# Patient Record
Sex: FEMALE | Race: WHITE | NOT HISPANIC OR LATINO | Employment: FULL TIME | URBAN - NONMETROPOLITAN AREA
[De-identification: names, ages, dates, MRNs, and addresses within clinical notes are randomized per-mention and may not be internally consistent; named-entity substitution may affect disease eponyms.]

---

## 2023-02-14 ENCOUNTER — OFFICE VISIT (OUTPATIENT)
Dept: FAMILY MEDICINE CLINIC | Facility: CLINIC | Age: 48
End: 2023-02-14
Payer: COMMERCIAL

## 2023-02-14 VITALS
HEIGHT: 64 IN | DIASTOLIC BLOOD PRESSURE: 72 MMHG | BODY MASS INDEX: 28 KG/M2 | RESPIRATION RATE: 15 BRPM | TEMPERATURE: 98 F | OXYGEN SATURATION: 98 % | SYSTOLIC BLOOD PRESSURE: 118 MMHG | HEART RATE: 88 BPM | WEIGHT: 164 LBS

## 2023-02-14 DIAGNOSIS — Z12.11 COLON CANCER SCREENING: ICD-10-CM

## 2023-02-14 DIAGNOSIS — I10 PRIMARY HYPERTENSION: ICD-10-CM

## 2023-02-14 DIAGNOSIS — Z12.31 ENCOUNTER FOR SCREENING MAMMOGRAM FOR MALIGNANT NEOPLASM OF BREAST: ICD-10-CM

## 2023-02-14 DIAGNOSIS — E89.0 POSTOPERATIVE HYPOTHYROIDISM: ICD-10-CM

## 2023-02-14 DIAGNOSIS — E78.2 MIXED HYPERLIPIDEMIA: ICD-10-CM

## 2023-02-14 DIAGNOSIS — Z00.00 WELL ADULT EXAM: Primary | ICD-10-CM

## 2023-02-14 LAB
ALBUMIN SERPL-MCNC: 4.6 G/DL (ref 3.5–5.2)
ALBUMIN/GLOB SERPL: 1.8 G/DL
ALP SERPL-CCNC: 44 U/L (ref 39–117)
ALT SERPL W P-5'-P-CCNC: 12 U/L (ref 1–33)
ANION GAP SERPL CALCULATED.3IONS-SCNC: 7.4 MMOL/L (ref 5–15)
AST SERPL-CCNC: 11 U/L (ref 1–32)
BACTERIA UR QL AUTO: ABNORMAL /HPF
BILIRUB SERPL-MCNC: 0.3 MG/DL (ref 0–1.2)
BILIRUB UR QL STRIP: NEGATIVE
BUN SERPL-MCNC: 14 MG/DL (ref 6–20)
BUN/CREAT SERPL: 23 (ref 7–25)
CALCIUM SPEC-SCNC: 8.8 MG/DL (ref 8.6–10.5)
CHLORIDE SERPL-SCNC: 105 MMOL/L (ref 98–107)
CHOLEST SERPL-MCNC: 209 MG/DL (ref 0–200)
CLARITY UR: ABNORMAL
CO2 SERPL-SCNC: 26.6 MMOL/L (ref 22–29)
COLOR UR: YELLOW
CREAT SERPL-MCNC: 0.61 MG/DL (ref 0.57–1)
DEPRECATED RDW RBC AUTO: 39.9 FL (ref 37–54)
EGFRCR SERPLBLD CKD-EPI 2021: 111.1 ML/MIN/1.73
ERYTHROCYTE [DISTWIDTH] IN BLOOD BY AUTOMATED COUNT: 12 % (ref 12.3–15.4)
GLOBULIN UR ELPH-MCNC: 2.6 GM/DL
GLUCOSE SERPL-MCNC: 96 MG/DL (ref 65–99)
GLUCOSE UR STRIP-MCNC: NEGATIVE MG/DL
HCT VFR BLD AUTO: 38.6 % (ref 34–46.6)
HCV AB SER DONR QL: NORMAL
HDLC SERPL-MCNC: 38 MG/DL (ref 40–60)
HGB BLD-MCNC: 12.8 G/DL (ref 12–15.9)
HGB UR QL STRIP.AUTO: ABNORMAL
HYALINE CASTS UR QL AUTO: ABNORMAL /LPF
KETONES UR QL STRIP: NEGATIVE
LDLC SERPL CALC-MCNC: 144 MG/DL (ref 0–100)
LDLC/HDLC SERPL: 3.72 {RATIO}
LEUKOCYTE ESTERASE UR QL STRIP.AUTO: NEGATIVE
MCH RBC QN AUTO: 30.1 PG (ref 26.6–33)
MCHC RBC AUTO-ENTMCNC: 33.2 G/DL (ref 31.5–35.7)
MCV RBC AUTO: 90.8 FL (ref 79–97)
NITRITE UR QL STRIP: NEGATIVE
PH UR STRIP.AUTO: 5.5 [PH] (ref 5–8)
PLATELET # BLD AUTO: 341 10*3/MM3 (ref 140–450)
PMV BLD AUTO: 10.8 FL (ref 6–12)
POTASSIUM SERPL-SCNC: 4.3 MMOL/L (ref 3.5–5.2)
PROT SERPL-MCNC: 7.2 G/DL (ref 6–8.5)
PROT UR QL STRIP: ABNORMAL
RBC # BLD AUTO: 4.25 10*6/MM3 (ref 3.77–5.28)
RBC # UR STRIP: ABNORMAL /HPF
REF LAB TEST METHOD: ABNORMAL
SODIUM SERPL-SCNC: 139 MMOL/L (ref 136–145)
SP GR UR STRIP: 1.02 (ref 1–1.03)
SQUAMOUS #/AREA URNS HPF: ABNORMAL /HPF
T4 FREE SERPL-MCNC: 1.63 NG/DL (ref 0.93–1.7)
TRIGL SERPL-MCNC: 149 MG/DL (ref 0–150)
TSH SERPL DL<=0.05 MIU/L-ACNC: 0.76 UIU/ML (ref 0.27–4.2)
UROBILINOGEN UR QL STRIP: ABNORMAL
VLDLC SERPL-MCNC: 27 MG/DL (ref 5–40)
WBC # UR STRIP: ABNORMAL /HPF
WBC NRBC COR # BLD: 5.02 10*3/MM3 (ref 3.4–10.8)

## 2023-02-14 PROCEDURE — 84439 ASSAY OF FREE THYROXINE: CPT | Performed by: FAMILY MEDICINE

## 2023-02-14 PROCEDURE — 86803 HEPATITIS C AB TEST: CPT | Performed by: FAMILY MEDICINE

## 2023-02-14 PROCEDURE — 90715 TDAP VACCINE 7 YRS/> IM: CPT | Performed by: FAMILY MEDICINE

## 2023-02-14 PROCEDURE — 90471 IMMUNIZATION ADMIN: CPT | Performed by: FAMILY MEDICINE

## 2023-02-14 PROCEDURE — 81001 URINALYSIS AUTO W/SCOPE: CPT | Performed by: FAMILY MEDICINE

## 2023-02-14 PROCEDURE — 80050 GENERAL HEALTH PANEL: CPT | Performed by: FAMILY MEDICINE

## 2023-02-14 PROCEDURE — 99396 PREV VISIT EST AGE 40-64: CPT | Performed by: FAMILY MEDICINE

## 2023-02-14 PROCEDURE — 80061 LIPID PANEL: CPT | Performed by: FAMILY MEDICINE

## 2023-02-14 RX ORDER — LEVOTHYROXINE SODIUM 0.12 MG/1
1 TABLET ORAL DAILY
COMMUNITY
Start: 2023-02-04

## 2023-02-14 RX ORDER — BISOPROLOL FUMARATE 10 MG/1
10 TABLET, FILM COATED ORAL
COMMUNITY
Start: 2023-02-07

## 2023-02-14 NOTE — PROGRESS NOTES
Female Physical Note      Date: 2023   Patient Name: Reanna Alejandre  : 1975   MRN: [id number]     Chief Complaint:    Chief Complaint   Patient presents with   • Annual Exam       History of Present Illness: Reanna Alejandre is a 47 y.o. female who is here today for their annual health maintenance and physical.  Patient has been doing well since last being seen without any difficulties except for her occasional headache.  Patient states that she does take Tylenol for this and does appear to be improved.  She denies any issues with her medications.  She is taking them without side effects.  Patient has continued with normal activity, appetite and sleep.  Patient denies any other cardiovascular, respiratory, gastrointestinal, urologic or neurologic complaints.      Subjective      Review of Systems:   Review of Systems   Constitutional: Negative for activity change, appetite change and fatigue.   Respiratory: Negative for cough, shortness of breath and wheezing.    Cardiovascular: Negative for chest pain, palpitations and leg swelling.   Gastrointestinal: Negative for abdominal distention, abdominal pain, blood in stool, constipation, diarrhea, nausea, vomiting, GERD and indigestion.   Genitourinary: Negative for difficulty urinating, dysuria, flank pain, frequency, hematuria and urgency.   Musculoskeletal: Negative for arthralgias, back pain, gait problem, joint swelling and myalgias.   Neurological: Positive for headache. Negative for dizziness, tremors, syncope, weakness and memory problem.   Psychiatric/Behavioral: Negative for behavioral problems, sleep disturbance and depressed mood. The patient is not nervous/anxious.        Past Medical History, Social History, Family History and Care Team were all reviewed with patient and updated as appropriate.     Medications:     Current Outpatient Medications:   •  bisoprolol (ZEBeta) 10 MG tablet, Take 10 mg by mouth every night at bedtime., Disp: , Rfl:   •   levothyroxine (SYNTHROID, LEVOTHROID) 125 MCG tablet, Take 1 tablet by mouth Daily., Disp: , Rfl:     Allergies:   No Known Allergies    Immunizations:  Health Maintenance Summary          Overdue - COLORECTAL CANCER SCREENING (COLONOSCOPY - Every 10 Years) Overdue - never done    No completion, postpone, or frequency change history exists for this topic.          Ordered - HEPATITIS C SCREENING (Once) Ordered on 2/14/2023    No completion, postpone, or frequency change history exists for this topic.          Overdue - PAP SMEAR (Every 3 Years) Overdue - never done    No completion, postpone, or frequency change history exists for this topic.          Ordered - LIPID PANEL (Yearly) Ordered on 2/14/2023    No completion, postpone, or frequency change history exists for this topic.          Postponed - INFLUENZA VACCINE (Yearly - August to March) Postponed until 3/31/2023    02/14/2023  Postponed until 3/31/2023 by Cory Meyer MD (Patient Refused)    12/03/2017  Imm Admin: Influenza Quad Vaccine (Inpatient)    12/02/2010  Imm Admin: Influenza Quad Vaccine (Inpatient)          Postponed - COVID-19 Vaccine (2 - Pfizer series) Postponed until 3/5/2024    02/14/2023  Postponed until 3/5/2024 by Cory Meyer MD (Pending event)    09/09/2021  Imm Admin: COVID-19 (PFIZER) PURPLE CAP          ANNUAL PHYSICAL (Yearly) Next due on 2/14/2024 02/14/2023  Done          TDAP/TD VACCINES (2 - Td or Tdap) Next due on 2/14/2033 02/14/2023  Imm Admin: Tdap          Pneumococcal Vaccine 0-64 (Series Information) Aged Out    02/14/2023  Postponed until 10/23/2040 by Cory Meyer MD (Not Indicated)                 Orders Placed This Encounter   Procedures   • Tdap Vaccine Greater Than or Equal To 6yo IM        Colorectal Screening:   No family history.  FIT test ordered.  Last Completed Colonoscopy     This patient has no relevant Health Maintenance data.        Pap: Patient states is  "up-to-date.  We will receive the information from Alley MACK.  If its been longer than 3 years we will make arrangements for follow-up Pap smear.  Last Completed Pap Smear     This patient has no relevant Health Maintenance data.         Mammogram: Ordered.      Last Completed Mammogram     This patient has no relevant Health Maintenance data.           CT for Smoker (Age 50-80, 20 pk yr):   Deferred.  Bone Density/DEXA (Age 65 or high risk): Deferred.  Hep C (Age 18-79 once): Ordered  HIV (Age 15-65 once): No results found for: HIV1X2  A1c: No results found for: HGBA1C   Lipid panel:  No results found for: LIPIDEXCLUSI    The ASCVD Risk score (Alex DK, et al., 2019) failed to calculate for the following reasons:    Cannot find a previous HDL lab    Cannot find a previous total cholesterol lab    Dermatology: Advise if necessary.  Ophthalmologist: Up-to-date.  Dentist: Up-to-date.    Tobacco Use: Medium Risk   • Smoking Tobacco Use: Former   • Smokeless Tobacco Use: Never   • Passive Exposure: Past       Social History     Substance and Sexual Activity   Alcohol Use Not Currently        Social History     Substance and Sexual Activity   Drug Use Not Currently        Diet/Physical activity: Well-balanced diet/daily exercise.    Sexual Health: No issues at present time.   Menopause: No issues at present time.  Menstrual Cycles: No issues at present time.    : PHQ-2 Depression Screening  PHQ-9 Total Score: 0     Measures:   Advanced Care Planning:   Patient does not have an advance directive, information provided.    Smoking Cessation:   Non-smoker.    Objective     Physical Exam:  Vital Signs:   Vitals:    02/14/23 0820   BP: 118/72   BP Location: Left arm   Patient Position: Sitting   Cuff Size: Adult   Pulse: 88   Resp: 15   Temp: 98 °F (36.7 °C)   SpO2: 98%   Weight: 74.4 kg (164 lb)   Height: 162.6 cm (64\")     Body mass index is 28.15 kg/m².     Physical Exam  Vitals and nursing note reviewed. "   Constitutional:       Appearance: Normal appearance.   HENT:      Head: Normocephalic and atraumatic.      Nose: Nose normal.      Mouth/Throat:      Pharynx: Oropharynx is clear.   Eyes:      Extraocular Movements: Extraocular movements intact.      Pupils: Pupils are equal, round, and reactive to light.   Neck:      Thyroid: No thyroid mass or thyromegaly.      Trachea: Trachea normal.   Cardiovascular:      Rate and Rhythm: Normal rate and regular rhythm.      Pulses: Normal pulses. No decreased pulses.      Heart sounds: Normal heart sounds.   Pulmonary:      Effort: Pulmonary effort is normal.      Breath sounds: Normal breath sounds.   Abdominal:      General: Abdomen is flat. Bowel sounds are normal.      Palpations: Abdomen is soft.      Tenderness: There is no abdominal tenderness.   Musculoskeletal:      Cervical back: Neck supple.      Right lower leg: No edema.      Left lower leg: No edema.   Lymphadenopathy:      Cervical: No cervical adenopathy.   Skin:     General: Skin is warm and dry.   Neurological:      General: No focal deficit present.      Mental Status: She is alert and oriented to person, place, and time.      Sensory: Sensation is intact.      Motor: Motor function is intact.      Coordination: Coordination is intact.   Psychiatric:         Attention and Perception: Attention normal.         Mood and Affect: Mood normal.         Speech: Speech normal.         Behavior: Behavior normal.         POCT Results (if applicable);   No results found for this or any previous visit.     Procedures    Assessment / Plan      Assessment/Plan:   Diagnoses and all orders for this visit:    1. Well adult exam (Primary)   Patient had wellness exam performed today that did not reveal any abnormality.  We did discuss anticipatory guidance as well as safety issues.  We will obtain a fit test for colon cancer screening.  We will order a mammogram and will find out when her last Pap smear results.  We will  continue to monitor and address accordingly.  -     CBC (No Diff); Future  -     Comprehensive Metabolic Panel; Future  -     Hepatitis C Antibody; Future  -     Lipid Panel; Future  -     Urinalysis With Culture If Indicated - Urine, Clean Catch; Future  -     TSH; Future  -     T4, Free; Future  -     Tdap Vaccine Greater Than or Equal To 6yo IM  -     Mammo Screening Digital Tomosynthesis Bilateral With CAD; Future  -     CBC (No Diff)  -     Comprehensive Metabolic Panel  -     Hepatitis C Antibody  -     Lipid Panel  -     Urinalysis With Culture If Indicated - Urine, Clean Catch  -     TSH  -     T4, Free    2. Mixed hyperlipidemia   A lipid profile will be obtained.  We will restart cholesterol medication if her levels suggest an increase in her 10-year cardiovascular risk.    3. Postoperative hypothyroidism   Patient is doing well symptomatically.  We will obtain thyroid function test will make adjustments based on these findings.    4. Primary hypertension   Blood pressure doing well at present time.  No adjustments are necessary.    5. Colon cancer screening   No family history.  A fit test will be ordered.    6. Encounter for screening mammogram for malignant neoplasm of breast   Mammogram will be ordered.       Healthcare Maintenance:  Counseling provided based on age appropriate USPSTF guidelines.  BMI is >= 25 and <30. (Overweight) The following options were offered after discussion;: exercise counseling/recommendations and nutrition counseling/recommendations    Reanna Alejandre voices understanding and acceptance of this advice and will call back with any further questions or concerns. AVS with preventive healthcare tips printed for patient.     Follow Up:   Return in about 6 months (around 8/14/2023) for Recheck.         At [facility], we believe that sharing information builds trust and better relationships. You are receiving this note because you recently visited [facility]. It is possible you  will see health information before a provider has talked with you about it. This kind of information can be easy to misunderstand. To help you fully understand what it means for your health, we urge you to discuss this note with your provider.    Cory Meyer MD  [facility]

## 2023-05-08 RX ORDER — BISOPROLOL FUMARATE 10 MG/1
TABLET, FILM COATED ORAL
Qty: 90 TABLET | Refills: 3 | Status: SHIPPED | OUTPATIENT
Start: 2023-05-08

## 2023-08-06 RX ORDER — LEVOTHYROXINE SODIUM 0.12 MG/1
TABLET ORAL DAILY
Qty: 90 TABLET | Refills: 1 | Status: SHIPPED | OUTPATIENT
Start: 2023-08-06

## 2023-08-15 ENCOUNTER — OFFICE VISIT (OUTPATIENT)
Dept: FAMILY MEDICINE CLINIC | Facility: CLINIC | Age: 48
End: 2023-08-15
Payer: COMMERCIAL

## 2023-08-15 VITALS
TEMPERATURE: 98.6 F | HEIGHT: 64 IN | HEART RATE: 83 BPM | DIASTOLIC BLOOD PRESSURE: 82 MMHG | SYSTOLIC BLOOD PRESSURE: 124 MMHG | BODY MASS INDEX: 26.36 KG/M2 | OXYGEN SATURATION: 98 % | WEIGHT: 154.4 LBS

## 2023-08-15 DIAGNOSIS — E78.2 MIXED HYPERLIPIDEMIA: Primary | ICD-10-CM

## 2023-08-15 DIAGNOSIS — E89.0 POSTOPERATIVE HYPOTHYROIDISM: ICD-10-CM

## 2023-08-15 DIAGNOSIS — I10 PRIMARY HYPERTENSION: ICD-10-CM

## 2023-08-15 PROCEDURE — 99213 OFFICE O/P EST LOW 20 MIN: CPT | Performed by: FAMILY MEDICINE

## 2023-08-15 NOTE — PATIENT INSTRUCTIONS
Advance Care Planning and Advance Directives     You make decisions on a daily basis - decisions about where you want to live, your career, your home, your life. Perhaps one of the most important decisions you face is your choice for future medical care. Take time to talk with your family and your healthcare team and start planning today.  Advance Care Planning is a process that can help you:  Understand possible future healthcare decisions in light of your own experiences  Reflect on those decision in light of your goals and values  Discuss your decisions with those closest to you and the healthcare professionals that care for you  Make a plan by creating a document that reflects your wishes    Surrogate Decision Maker  In the event of a medical emergency, which has left you unable to communicate or to make your own decisions, you would need someone to make decisions for you.  It is important to discuss your preferences for medical treatment with this person while you are in good health.     Qualities of a surrogate decision maker:  Willing to take on this role and responsibility  Knows what you want for future medical care  Willing to follow your wishes even if they don't agree with them  Able to make difficult medical decisions under stressful circumstances    Advance Directives  These are legal documents you can create that will guide your healthcare team and decision maker(s) when needed. These documents can be stored in the electronic medical record.    Living Will - a legal document to guide your care if you have a terminal condition or a serious illness and are unable to communicate. States vary by statute in document names/types, but most forms may include one or more of the following:        -  Directions regarding life-prolonging treatments        -  Directions regarding artificially provided nutrition/hydration        -  Choosing a healthcare decision maker        -  Direction regarding organ/tissue  donation    Durable Power of  for Healthcare - this document names an -in-fact to make medical decisions for you, but it may also allow this person to make personal and financial decisions for you. Please seek the advice of an  if you need this type of document.    **Advance Directives are not required and no one may discriminate against you if you do not sign one.    Medical Orders  Many states allow specific forms/orders signed by your physician to record your wishes for medical treatment in your current state of health. This form, signed in personal communication with your physician, addresses resuscitation and other medical interventions that you may or may not want.      For more information or to schedule a time with a [facility] Advance Care Planning Facilitator contact: [email or web address] or call [phone] and someone will contact you directly.

## 2023-08-15 NOTE — PROGRESS NOTES
Follow Up Office Visit      Date: 08/15/2023   Patient Name: Reanna Alejandre  : 1975   MRN: [id number]     Chief Complaint:    Chief Complaint   Patient presents with    Follow-up       History of Present Illness: Reanna Alejandre is a 47 y.o. female who is here today for follow-up.  Patient been doing well since last being seen with time problems noted.  She does continue to take her medication as prescribed without side effects.  She has continue with her usual activity, appetite and sleep.  Patient denies any other cardiovascular, respiratory, gastrointestinal, urologic or neurologic complaints.    Subjective      Review of Systems:   Review of Systems   Constitutional:  Negative for activity change, appetite change and fatigue.   Respiratory:  Negative for cough and shortness of breath.    Cardiovascular:  Negative for chest pain, palpitations and leg swelling.   Gastrointestinal:  Negative for abdominal pain, constipation, diarrhea, nausea, vomiting, GERD and indigestion.   Genitourinary:  Negative for dysuria, flank pain, frequency and urgency.   Musculoskeletal:  Negative for arthralgias and myalgias.   Neurological:  Negative for dizziness, weakness and memory problem.   Psychiatric/Behavioral:  Negative for sleep disturbance and depressed mood. The patient is not nervous/anxious.      I have reviewed the patients family history, social history, past medical history, past surgical history and have updated it as appropriate.     Medications:     Current Outpatient Medications:     bisoprolol (ZEBeta) 10 MG tablet, TAKE 1 TABLET BY MOUTH EVERY NIGHT AT BEDTIME, Disp: 90 tablet, Rfl: 3    levothyroxine (SYNTHROID, LEVOTHROID) 125 MCG tablet, TAKE 1 TABLET BY MOUTH DAILY, Disp: 90 tablet, Rfl: 1    Allergies:   No Known Allergies    Immunizations:   Immunization History   Administered Date(s) Administered    COVID-19 (PFIZER) Purple Cap Monovalent 2021    Influenza Quad Vaccine (Inpatient) 2010,  "12/03/2017    Tdap 02/14/2023        Objective     Physical Exam: Please see above  Vital Signs:   Vitals:    08/15/23 0807   BP: 124/82   BP Location: Left arm   Patient Position: Sitting   Cuff Size: Adult   Pulse: 83   Temp: 98.6 øF (37 øC)   SpO2: 98%   Weight: 70 kg (154 lb 6.4 oz)   Height: 162.6 cm (64\")  Comment: patient stated     Body mass index is 26.5 kg/mý.          Physical Exam  Vitals and nursing note reviewed.   Constitutional:       Appearance: Normal appearance.   HENT:      Head: Normocephalic and atraumatic.      Nose: Nose normal.      Mouth/Throat:      Pharynx: Oropharynx is clear.   Eyes:      Extraocular Movements: Extraocular movements intact.      Pupils: Pupils are equal, round, and reactive to light.   Neck:      Thyroid: No thyroid mass or thyromegaly.      Trachea: Trachea normal.   Cardiovascular:      Rate and Rhythm: Normal rate and regular rhythm.      Pulses: Normal pulses. No decreased pulses.      Heart sounds: Normal heart sounds.   Pulmonary:      Effort: Pulmonary effort is normal.      Breath sounds: Normal breath sounds.   Abdominal:      General: Abdomen is flat. Bowel sounds are normal.      Palpations: Abdomen is soft.      Tenderness: There is no abdominal tenderness.   Musculoskeletal:      Cervical back: Neck supple.      Right lower leg: No edema.      Left lower leg: No edema.   Lymphadenopathy:      Cervical: No cervical adenopathy.   Skin:     General: Skin is warm and dry.   Neurological:      General: No focal deficit present.      Mental Status: She is alert and oriented to person, place, and time.      Sensory: Sensation is intact.      Motor: Motor function is intact.      Coordination: Coordination is intact.   Psychiatric:         Attention and Perception: Attention normal.         Mood and Affect: Mood normal.         Speech: Speech normal.         Behavior: Behavior normal.       Procedures    Results:   Labs:   TSH   Date Value Ref Range Status "   02/14/2023 0.761 0.270 - 4.200 uIU/mL Final        POCT Results (if applicable):   Results for orders placed or performed in visit on 02/21/23   POCT FECAL OCCULT BLOOD BY IMMUNOASSAY    Specimen: Stool   Result Value Ref Range    POC OCCULT BLOOD BY IMMUNOASSAY Negative Negative       Imaging:   No valid procedures specified.     Measures:   Advanced Care Planning:   Patient does not have an advance directive, information provided.    Smoking Cessation:   Non-smoker.    Assessment / Plan      Assessment/Plan:   Diagnoses and all orders for this visit:    1. Mixed hyperlipidemia (Primary)   We have discussed her continuing the diet regimen.  We will plan on obtaining laboratory data in 6 months when she returns to clinic.  If there are other concerns further assessment treatment will be necessary.    2. Postoperative hypothyroidism   Patient is doing well at present time with respect to her dose of Synthroid.  Patient has not had any complaints of hypothyroidism at present time.  We will not obtain laboratory data today.  We will plan on reassessing in 6 months time.    3. Primary hypertension   Blood pressure is doing well currently.  We will not make any adjustments at present time.  She has tolerated the medication thus far.      Follow Up:   Return in about 6 months (around 2/15/2024) for Annual physical.      At [facility], we believe that sharing information builds trust and better relationships. You are receiving this note because you recently visited [facility]. It is possible you will see health information before a provider has talked with you about it. This kind of information can be easy to misunderstand. To help you fully understand what it means for your health, we urge you to discuss this note with your provider.    Cory Meyer MD  [facility] LancasterAnswers submitted by the patient for this visit:  Primary Reason for Visit (Submitted on 8/14/2023)  What is the primary reason for  your visit?: Physical

## 2024-02-02 RX ORDER — LEVOTHYROXINE SODIUM 0.12 MG/1
TABLET ORAL DAILY
Qty: 90 TABLET | Refills: 1 | Status: SHIPPED | OUTPATIENT
Start: 2024-02-02

## 2024-02-15 ENCOUNTER — OFFICE VISIT (OUTPATIENT)
Dept: FAMILY MEDICINE CLINIC | Facility: CLINIC | Age: 49
End: 2024-02-15
Payer: COMMERCIAL

## 2024-02-15 ENCOUNTER — PATIENT ROUNDING (BHMG ONLY) (OUTPATIENT)
Dept: FAMILY MEDICINE CLINIC | Facility: CLINIC | Age: 49
End: 2024-02-15
Payer: COMMERCIAL

## 2024-02-15 ENCOUNTER — LAB (OUTPATIENT)
Dept: FAMILY MEDICINE CLINIC | Facility: CLINIC | Age: 49
End: 2024-02-15
Payer: COMMERCIAL

## 2024-02-15 VITALS
OXYGEN SATURATION: 99 % | TEMPERATURE: 98.2 F | DIASTOLIC BLOOD PRESSURE: 80 MMHG | HEART RATE: 85 BPM | SYSTOLIC BLOOD PRESSURE: 134 MMHG | BODY MASS INDEX: 26.15 KG/M2 | WEIGHT: 153.2 LBS | HEIGHT: 64 IN | RESPIRATION RATE: 16 BRPM

## 2024-02-15 DIAGNOSIS — Z12.31 ENCOUNTER FOR SCREENING MAMMOGRAM FOR MALIGNANT NEOPLASM OF BREAST: ICD-10-CM

## 2024-02-15 DIAGNOSIS — Z00.00 WELL ADULT EXAM: ICD-10-CM

## 2024-02-15 DIAGNOSIS — E53.8 B12 DEFICIENCY: ICD-10-CM

## 2024-02-15 DIAGNOSIS — Z00.00 WELL ADULT EXAM: Primary | ICD-10-CM

## 2024-02-15 DIAGNOSIS — B35.4 TINEA CORPORIS: ICD-10-CM

## 2024-02-15 DIAGNOSIS — Z12.11 COLON CANCER SCREENING: ICD-10-CM

## 2024-02-15 DIAGNOSIS — E78.2 MIXED HYPERLIPIDEMIA: ICD-10-CM

## 2024-02-15 DIAGNOSIS — E89.0 POSTOPERATIVE HYPOTHYROIDISM: ICD-10-CM

## 2024-02-15 DIAGNOSIS — I10 PRIMARY HYPERTENSION: ICD-10-CM

## 2024-02-15 LAB
ALBUMIN SERPL-MCNC: 4.3 G/DL (ref 3.5–5.2)
ALBUMIN/GLOB SERPL: 1.4 G/DL
ALP SERPL-CCNC: 42 U/L (ref 39–117)
ALT SERPL W P-5'-P-CCNC: 11 U/L (ref 1–33)
ANION GAP SERPL CALCULATED.3IONS-SCNC: 8.9 MMOL/L (ref 5–15)
AST SERPL-CCNC: 16 U/L (ref 1–32)
BILIRUB SERPL-MCNC: <0.2 MG/DL (ref 0–1.2)
BUN SERPL-MCNC: 14 MG/DL (ref 6–20)
BUN/CREAT SERPL: 20.3 (ref 7–25)
CALCIUM SPEC-SCNC: 8.8 MG/DL (ref 8.6–10.5)
CHLORIDE SERPL-SCNC: 104 MMOL/L (ref 98–107)
CHOLEST SERPL-MCNC: 204 MG/DL (ref 0–200)
CO2 SERPL-SCNC: 26.1 MMOL/L (ref 22–29)
CREAT SERPL-MCNC: 0.69 MG/DL (ref 0.57–1)
DEPRECATED RDW RBC AUTO: 37 FL (ref 37–54)
EGFRCR SERPLBLD CKD-EPI 2021: 107.2 ML/MIN/1.73
ERYTHROCYTE [DISTWIDTH] IN BLOOD BY AUTOMATED COUNT: 11.7 % (ref 12.3–15.4)
GLOBULIN UR ELPH-MCNC: 3 GM/DL
GLUCOSE SERPL-MCNC: 88 MG/DL (ref 65–99)
HBA1C MFR BLD: 5.4 % (ref 4.8–5.6)
HCT VFR BLD AUTO: 37.3 % (ref 34–46.6)
HDLC SERPL-MCNC: 39 MG/DL (ref 40–60)
HGB BLD-MCNC: 12.5 G/DL (ref 12–15.9)
LDLC SERPL CALC-MCNC: 142 MG/DL (ref 0–100)
LDLC/HDLC SERPL: 3.57 {RATIO}
MCH RBC QN AUTO: 29.9 PG (ref 26.6–33)
MCHC RBC AUTO-ENTMCNC: 33.5 G/DL (ref 31.5–35.7)
MCV RBC AUTO: 89.2 FL (ref 79–97)
PLATELET # BLD AUTO: 360 10*3/MM3 (ref 140–450)
PMV BLD AUTO: 10.5 FL (ref 6–12)
POTASSIUM SERPL-SCNC: 4.3 MMOL/L (ref 3.5–5.2)
PROT SERPL-MCNC: 7.3 G/DL (ref 6–8.5)
RBC # BLD AUTO: 4.18 10*6/MM3 (ref 3.77–5.28)
SODIUM SERPL-SCNC: 139 MMOL/L (ref 136–145)
TRIGL SERPL-MCNC: 129 MG/DL (ref 0–150)
TSH SERPL DL<=0.05 MIU/L-ACNC: 0.27 UIU/ML (ref 0.27–4.2)
VLDLC SERPL-MCNC: 23 MG/DL (ref 5–40)
WBC NRBC COR # BLD AUTO: 4.67 10*3/MM3 (ref 3.4–10.8)

## 2024-02-15 PROCEDURE — 80061 LIPID PANEL: CPT | Performed by: FAMILY MEDICINE

## 2024-02-15 PROCEDURE — 80050 GENERAL HEALTH PANEL: CPT | Performed by: FAMILY MEDICINE

## 2024-02-15 PROCEDURE — 81003 URINALYSIS AUTO W/O SCOPE: CPT | Performed by: FAMILY MEDICINE

## 2024-02-15 PROCEDURE — 82607 VITAMIN B-12: CPT | Performed by: FAMILY MEDICINE

## 2024-02-15 PROCEDURE — 84439 ASSAY OF FREE THYROXINE: CPT | Performed by: FAMILY MEDICINE

## 2024-02-15 PROCEDURE — 83036 HEMOGLOBIN GLYCOSYLATED A1C: CPT | Performed by: FAMILY MEDICINE

## 2024-02-15 RX ORDER — CLOTRIMAZOLE AND BETAMETHASONE DIPROPIONATE 10; .64 MG/G; MG/G
1 CREAM TOPICAL 2 TIMES DAILY
Qty: 45 G | Refills: 0 | Status: SHIPPED | OUTPATIENT
Start: 2024-02-15

## 2024-02-16 DIAGNOSIS — E89.0 POSTOPERATIVE HYPOTHYROIDISM: Primary | ICD-10-CM

## 2024-02-16 LAB
BILIRUB UR QL STRIP: NEGATIVE
CLARITY UR: ABNORMAL
COLOR UR: ABNORMAL
GLUCOSE UR STRIP-MCNC: NEGATIVE MG/DL
HGB UR QL STRIP.AUTO: ABNORMAL
KETONES UR QL STRIP: NEGATIVE
LEUKOCYTE ESTERASE UR QL STRIP.AUTO: NEGATIVE
NITRITE UR QL STRIP: NEGATIVE
PH UR STRIP.AUTO: 6 [PH] (ref 5–8)
PROT UR QL STRIP: NEGATIVE
SP GR UR STRIP: 1.02 (ref 1–1.03)
T4 FREE SERPL-MCNC: 1.97 NG/DL (ref 0.93–1.7)
UROBILINOGEN UR QL STRIP: ABNORMAL
VIT B12 BLD-MCNC: >2000 PG/ML (ref 211–946)

## 2024-02-16 RX ORDER — LEVOTHYROXINE SODIUM 112 UG/1
112 TABLET ORAL DAILY
Qty: 90 TABLET | Refills: 3 | Status: SHIPPED | OUTPATIENT
Start: 2024-02-16

## 2024-06-26 RX ORDER — BISOPROLOL FUMARATE 10 MG/1
10 TABLET, FILM COATED ORAL
Qty: 90 TABLET | Refills: 3 | Status: SHIPPED | OUTPATIENT
Start: 2024-06-26

## 2024-06-26 NOTE — TELEPHONE ENCOUNTER
Caller: Reanna Alejandre    Relationship: Self    Best call back number:     [phone]       Requested Prescriptions:   Requested Prescriptions     Pending Prescriptions Disp Refills    bisoprolol (ZEBeta) 10 MG tablet 90 tablet 3     Sig: Take 1 tablet by mouth every night at bedtime.        Pharmacy where request should be sent: [id number] [address] [facility] [address] [facility] [address] [facility] [address] - [phone] [facility] [phone] FX     Last office visit with prescribing clinician: 2/15/2024   Last telemedicine visit with prescribing clinician: Visit date not found   Next office visit with prescribing clinician: 8/15/2024     Additional details provided by patient: PATIENT IS OUT OF MEDICATION.    Does the patient have less than a 3 day supply:  [x] Yes  [] No    Would you like a call back once the refill request has been completed: [x] Yes [] No    If the office needs to give you a call back, can they leave a voicemail: [x] Yes [] No    Hermann Brooks Rep   06/26/24 07:45 EDT

## 2024-08-15 ENCOUNTER — LAB (OUTPATIENT)
Dept: FAMILY MEDICINE CLINIC | Facility: CLINIC | Age: 49
End: 2024-08-15
Payer: COMMERCIAL

## 2024-08-15 ENCOUNTER — OFFICE VISIT (OUTPATIENT)
Dept: FAMILY MEDICINE CLINIC | Facility: CLINIC | Age: 49
End: 2024-08-15
Payer: COMMERCIAL

## 2024-08-15 VITALS
SYSTOLIC BLOOD PRESSURE: 148 MMHG | HEIGHT: 64 IN | HEART RATE: 73 BPM | OXYGEN SATURATION: 96 % | TEMPERATURE: 97.5 F | DIASTOLIC BLOOD PRESSURE: 100 MMHG | BODY MASS INDEX: 26.29 KG/M2 | WEIGHT: 154 LBS | RESPIRATION RATE: 18 BRPM

## 2024-08-15 DIAGNOSIS — E53.8 B12 DEFICIENCY: ICD-10-CM

## 2024-08-15 DIAGNOSIS — E89.0 POSTOPERATIVE HYPOTHYROIDISM: ICD-10-CM

## 2024-08-15 DIAGNOSIS — E78.2 MIXED HYPERLIPIDEMIA: ICD-10-CM

## 2024-08-15 DIAGNOSIS — I10 PRIMARY HYPERTENSION: Primary | ICD-10-CM

## 2024-08-15 PROCEDURE — 99214 OFFICE O/P EST MOD 30 MIN: CPT | Performed by: FAMILY MEDICINE

## 2024-08-15 PROCEDURE — 84480 ASSAY TRIIODOTHYRONINE (T3): CPT | Performed by: FAMILY MEDICINE

## 2024-08-15 PROCEDURE — 84439 ASSAY OF FREE THYROXINE: CPT | Performed by: FAMILY MEDICINE

## 2024-08-15 PROCEDURE — 84443 ASSAY THYROID STIM HORMONE: CPT | Performed by: FAMILY MEDICINE

## 2024-08-15 RX ORDER — LEVOTHYROXINE SODIUM 0.12 MG/1
1 TABLET ORAL DAILY
COMMUNITY
Start: 2024-08-02 | End: 2024-08-15

## 2024-08-15 RX ORDER — TELMISARTAN 40 MG/1
40 TABLET ORAL DAILY
Qty: 90 TABLET | Refills: 3 | Status: SHIPPED | OUTPATIENT
Start: 2024-08-15

## 2024-08-15 NOTE — PROGRESS NOTES
"    Follow Up Office Visit      Date: 08/15/2024   Patient Name: Reanna Alejandre  : 1975   MRN: [id number]     Chief Complaint:    Chief Complaint   Patient presents with    Follow-up     6 month        History of Present Illness: Reanna Alejandre is a 48 y.o. female who is here today for follow-up.  Patient has been doing relatively well since last being seen.  She does continue to take her medications as prescribed.  She does feel that she may be a little bit more \"sluggish\" since decreasing the thyroid medicine down to 212 mcg.  She is otherwise been doing well with the other medications without any issues.  Patient appears to be doing otherwise well.  They have continue with their medications without any side effects.  They have not had any changes in their usual activity, appetite and sleep.  Patient denies any other cardiovascular, respiratory, gastrointestinal, urologic or neurologic complaints.    Subjective      Review of Systems:   Review of Systems   Constitutional:  Negative for activity change, appetite change and fatigue.   Respiratory:  Negative for cough, chest tightness, shortness of breath and wheezing.    Cardiovascular:  Negative for chest pain, palpitations and leg swelling.   Gastrointestinal:  Negative for abdominal distention, abdominal pain, blood in stool, constipation, diarrhea, nausea, vomiting, GERD and indigestion.   Genitourinary:  Negative for difficulty urinating, dysuria, flank pain, frequency, hematuria and urgency.   Musculoskeletal:  Negative for arthralgias, back pain, gait problem, joint swelling and myalgias.   Neurological:  Negative for dizziness, tremors, seizures, syncope, weakness, light-headedness, numbness, headache and memory problem.   Psychiatric/Behavioral:  Negative for sleep disturbance and depressed mood. The patient is not nervous/anxious.        I have reviewed the patients family history, social history, past medical history, past surgical history and have " "updated it as appropriate.     Medications:     Current Outpatient Medications:     bisoprolol (ZEBeta) 10 MG tablet, Take 1 tablet by mouth every night at bedtime., Disp: 90 tablet, Rfl: 3    clotrimazole-betamethasone (Lotrisone) 1-0.05 % cream, Apply 1 Application topically to the appropriate area as directed 2 (Two) Times a Day., Disp: 45 g, Rfl: 0    Cyanocobalamin (VITAMIN B12 PO), Take  by mouth., Disp: , Rfl:     levothyroxine (SYNTHROID, LEVOTHROID) 112 MCG tablet, Take 1 tablet by mouth Daily., Disp: 90 tablet, Rfl: 3    telmisartan (MICARDIS) 40 MG tablet, Take 1 tablet by mouth Daily., Disp: 90 tablet, Rfl: 3    Allergies:   No Known Allergies    Immunizations:   Immunization History   Administered Date(s) Administered    COVID-19 (PFIZER) Purple Cap Monovalent 09/09/2021    Fluzone  >6mos 12/02/2010, 12/03/2017    Tdap 02/14/2023        Objective     Physical Exam: Please see above  Vital Signs:   Vitals:    08/15/24 0800 08/15/24 0810   BP: 140/100 148/100   BP Location: Left arm    Patient Position: Sitting    Cuff Size: Adult    Pulse: 73    Resp: 18    Temp: 97.5 °F (36.4 °C)    TempSrc: Temporal    SpO2: 96%    Weight: 69.9 kg (154 lb)    Height: 162.6 cm (64.02\")    PainSc: 0-No pain      Body mass index is 26.42 kg/m².          Physical Exam  Vitals and nursing note reviewed.   Constitutional:       Appearance: Normal appearance.   HENT:      Head: Normocephalic and atraumatic.      Nose: Nose normal.      Mouth/Throat:      Pharynx: Oropharynx is clear.   Eyes:      Extraocular Movements: Extraocular movements intact.      Pupils: Pupils are equal, round, and reactive to light.   Neck:      Thyroid: No thyroid mass or thyromegaly.      Trachea: Trachea normal.   Cardiovascular:      Rate and Rhythm: Normal rate and regular rhythm.      Pulses: Normal pulses. No decreased pulses.      Heart sounds: Normal heart sounds.   Pulmonary:      Effort: Pulmonary effort is normal.      Breath sounds: " Normal breath sounds.   Abdominal:      General: Abdomen is flat. Bowel sounds are normal.      Palpations: Abdomen is soft.      Tenderness: There is no abdominal tenderness.   Musculoskeletal:      Cervical back: Neck supple.      Right lower leg: No edema.      Left lower leg: No edema.   Lymphadenopathy:      Cervical: No cervical adenopathy.   Skin:     General: Skin is warm and dry.   Neurological:      General: No focal deficit present.      Mental Status: She is alert and oriented to person, place, and time.      Sensory: Sensation is intact.      Motor: Motor function is intact.      Coordination: Coordination is intact.   Psychiatric:         Attention and Perception: Attention normal.         Mood and Affect: Mood normal.         Speech: Speech normal.         Behavior: Behavior normal.         Procedures    Results:   Labs:   Hemoglobin A1C   Date Value Ref Range Status   02/15/2024 5.40 4.80 - 5.60 % Final     TSH   Date Value Ref Range Status   02/15/2024 0.266 (L) 0.270 - 4.200 uIU/mL Final        POCT Results (if applicable):   Results for orders placed or performed in visit on 02/15/24   Cologuard - Stool, Per Rectum    Specimen: Per Rectum; Stool   Result Value Ref Range    Cologuard Negative Negative       Imaging:   No valid procedures specified.     Measures:   Advanced Care Planning:   Patient does not have an advance directive, information provided.    Smoking Cessation:   Non-smoker.    Assessment / Plan      Assessment/Plan:   Diagnoses and all orders for this visit:    1. Primary hypertension (Primary)  Patient appears to be tolerating their blood pressure medicine without any side effects.  We will continue to monitor their blood pressure and will adjust to keep there is systolic blood pressure less than 130.  We will continue to monitor renal function and will make adjustments based on these findings.  Patient's blood pressure has been elevated.  We have discussed options and will start  her on telmisartan at initial dose to 20 mg to be increased up to 80 mg if necessary to maintain an adequate blood pressure.  She will monitor her symptoms and if she has other issues further assessment treatment may be necessary.  If she does not have improvement in her blood pressure, she will contact us and we will further modify.  -     telmisartan (MICARDIS) 40 MG tablet; Take 1 tablet by mouth Daily.  Dispense: 90 tablet; Refill: 3    2. Postoperative hypothyroidism   Patient does appear to be doing well at present time with respect to their thyroid symptomatology.  They appear to be tolerating the medication without complaints.  We will continue to monitor the thyroid function test and if they develop any abnormalities further adjustments of medication may be necessary.  If they develop any signs of hypothyroidism or hyperthyroidism they will contact us immediately for evaluation.    3. Mixed hyperlipidemia   Patient does appear to be tolerating their lipid-lowering agent without difficulty.  We will obtain the lipid profile as well as liver function test to observe for any abnormalities.  We will continue to adjust medications to keep their LDL less than 70.    4. B12 deficiency   Patient has been doing well with oral B12 supplementation.  Last level was appropriate.  She was able to reduce her dosing due to her previous level.  We will anticipate rechecking her B12 when she returns to clinic in 6 months.      Follow Up:   Return in about 6 months (around 2/15/2025).      At [facility], we believe that sharing information builds trust and better relationships. You are receiving this note because you recently visited [facility]. It is possible you will see health information before a provider has talked with you about it. This kind of information can be easy to misunderstand. To help you fully understand what it means for your health, we urge you to discuss this note with your provider.    Cory AIKEN  MD Mitch  [facility]  Answers submitted by the patient for this visit:  Other (Submitted on 8/14/2024)  Please describe your symptoms.: 6 month check up  Have you had these symptoms before?: No  How long have you been having these symptoms?: Greater than 2 weeks  Please list any medications you are currently taking for this condition.: Levothyroxine  Please describe any probable cause for these symptoms. : N/a  Primary Reason for Visit (Submitted on 8/14/2024)  What is the primary reason for your visit?: Other

## 2024-08-16 DIAGNOSIS — E89.0 POSTOPERATIVE HYPOTHYROIDISM: Primary | ICD-10-CM

## 2024-08-16 LAB
T3 SERPL-MCNC: 92.8 NG/DL (ref 80–200)
T4 FREE SERPL-MCNC: 1.85 NG/DL (ref 0.92–1.68)
TSH SERPL DL<=0.05 MIU/L-ACNC: 3.05 UIU/ML (ref 0.27–4.2)

## 2024-08-19 ENCOUNTER — PATIENT MESSAGE (OUTPATIENT)
Dept: FAMILY MEDICINE CLINIC | Facility: CLINIC | Age: 49
End: 2024-08-19
Payer: COMMERCIAL

## 2024-11-01 DIAGNOSIS — E89.0 POSTOPERATIVE HYPOTHYROIDISM: ICD-10-CM

## 2024-11-01 RX ORDER — LEVOTHYROXINE SODIUM 112 UG/1
112 TABLET ORAL DAILY
Qty: 90 TABLET | Refills: 3 | Status: SHIPPED | OUTPATIENT
Start: 2024-11-01

## 2024-11-01 RX ORDER — LEVOTHYROXINE SODIUM 112 UG/1
112 TABLET ORAL DAILY
Qty: 90 TABLET | Refills: 3 | OUTPATIENT
Start: 2024-11-01

## 2025-02-20 ENCOUNTER — PATIENT ROUNDING (BHMG ONLY) (OUTPATIENT)
Dept: FAMILY MEDICINE CLINIC | Facility: CLINIC | Age: 50
End: 2025-02-20
Payer: COMMERCIAL

## 2025-02-20 ENCOUNTER — LAB (OUTPATIENT)
Dept: FAMILY MEDICINE CLINIC | Facility: CLINIC | Age: 50
End: 2025-02-20
Payer: COMMERCIAL

## 2025-02-20 ENCOUNTER — OFFICE VISIT (OUTPATIENT)
Dept: FAMILY MEDICINE CLINIC | Facility: CLINIC | Age: 50
End: 2025-02-20
Payer: COMMERCIAL

## 2025-02-20 VITALS
RESPIRATION RATE: 16 BRPM | BODY MASS INDEX: 26.87 KG/M2 | SYSTOLIC BLOOD PRESSURE: 136 MMHG | OXYGEN SATURATION: 96 % | HEART RATE: 65 BPM | DIASTOLIC BLOOD PRESSURE: 94 MMHG | HEIGHT: 64 IN | TEMPERATURE: 98 F | WEIGHT: 157.4 LBS

## 2025-02-20 DIAGNOSIS — Z00.00 WELL ADULT EXAM: ICD-10-CM

## 2025-02-20 DIAGNOSIS — E89.0 POSTOPERATIVE HYPOTHYROIDISM: ICD-10-CM

## 2025-02-20 DIAGNOSIS — Z00.00 WELL ADULT EXAM: Primary | ICD-10-CM

## 2025-02-20 DIAGNOSIS — Z28.21 IMMUNIZATION DECLINED: ICD-10-CM

## 2025-02-20 DIAGNOSIS — E78.2 MIXED HYPERLIPIDEMIA: ICD-10-CM

## 2025-02-20 DIAGNOSIS — Z91.89 INCREASED RISK OF BREAST CANCER: ICD-10-CM

## 2025-02-20 DIAGNOSIS — I10 PRIMARY HYPERTENSION: ICD-10-CM

## 2025-02-20 LAB
ALBUMIN SERPL-MCNC: 4.2 G/DL (ref 3.5–5.2)
ALBUMIN/GLOB SERPL: 1.4 G/DL
ALP SERPL-CCNC: 47 U/L (ref 39–117)
ALT SERPL W P-5'-P-CCNC: 11 U/L (ref 1–33)
ANION GAP SERPL CALCULATED.3IONS-SCNC: 13.5 MMOL/L (ref 5–15)
AST SERPL-CCNC: 14 U/L (ref 1–32)
BILIRUB SERPL-MCNC: 0.4 MG/DL (ref 0–1.2)
BILIRUB UR QL STRIP: NEGATIVE
BUN SERPL-MCNC: 10 MG/DL (ref 6–20)
BUN/CREAT SERPL: 14.5 (ref 7–25)
CALCIUM SPEC-SCNC: 9.3 MG/DL (ref 8.6–10.5)
CHLORIDE SERPL-SCNC: 106 MMOL/L (ref 98–107)
CHOLEST SERPL-MCNC: 221 MG/DL (ref 0–200)
CLARITY UR: CLEAR
CO2 SERPL-SCNC: 25.5 MMOL/L (ref 22–29)
COLOR UR: YELLOW
CREAT SERPL-MCNC: 0.69 MG/DL (ref 0.57–1)
DEPRECATED RDW RBC AUTO: 38.1 FL (ref 37–54)
EGFRCR SERPLBLD CKD-EPI 2021: 106.5 ML/MIN/1.73
ERYTHROCYTE [DISTWIDTH] IN BLOOD BY AUTOMATED COUNT: 11.4 % (ref 12.3–15.4)
GLOBULIN UR ELPH-MCNC: 3.1 GM/DL
GLUCOSE SERPL-MCNC: 91 MG/DL (ref 65–99)
GLUCOSE UR STRIP-MCNC: NEGATIVE MG/DL
HCT VFR BLD AUTO: 39.8 % (ref 34–46.6)
HDLC SERPL-MCNC: 42 MG/DL (ref 40–60)
HGB BLD-MCNC: 12.7 G/DL (ref 12–15.9)
HGB UR QL STRIP.AUTO: ABNORMAL
KETONES UR QL STRIP: NEGATIVE
LDLC SERPL CALC-MCNC: 155 MG/DL (ref 0–100)
LDLC/HDLC SERPL: 3.64 {RATIO}
LEUKOCYTE ESTERASE UR QL STRIP.AUTO: NEGATIVE
MCH RBC QN AUTO: 29.7 PG (ref 26.6–33)
MCHC RBC AUTO-ENTMCNC: 31.9 G/DL (ref 31.5–35.7)
MCV RBC AUTO: 93 FL (ref 79–97)
NITRITE UR QL STRIP: NEGATIVE
PH UR STRIP.AUTO: 6.5 [PH] (ref 5–8)
PLATELET # BLD AUTO: 365 10*3/MM3 (ref 140–450)
PMV BLD AUTO: 11.2 FL (ref 6–12)
POTASSIUM SERPL-SCNC: 5.2 MMOL/L (ref 3.5–5.2)
PROT SERPL-MCNC: 7.3 G/DL (ref 6–8.5)
PROT UR QL STRIP: NEGATIVE
RBC # BLD AUTO: 4.28 10*6/MM3 (ref 3.77–5.28)
SODIUM SERPL-SCNC: 145 MMOL/L (ref 136–145)
SP GR UR STRIP: 1.01 (ref 1–1.03)
T3 SERPL-MCNC: 81.1 NG/DL (ref 80–200)
T4 FREE SERPL-MCNC: 1.63 NG/DL (ref 0.92–1.68)
TRIGL SERPL-MCNC: 130 MG/DL (ref 0–150)
TSH SERPL DL<=0.05 MIU/L-ACNC: 2.29 UIU/ML (ref 0.27–4.2)
UROBILINOGEN UR QL STRIP: ABNORMAL
VIT B12 BLD-MCNC: 1262 PG/ML (ref 211–946)
VLDLC SERPL-MCNC: 24 MG/DL (ref 5–40)
WBC NRBC COR # BLD AUTO: 5.12 10*3/MM3 (ref 3.4–10.8)

## 2025-02-20 PROCEDURE — 84439 ASSAY OF FREE THYROXINE: CPT | Performed by: FAMILY MEDICINE

## 2025-02-20 PROCEDURE — 99214 OFFICE O/P EST MOD 30 MIN: CPT | Performed by: FAMILY MEDICINE

## 2025-02-20 PROCEDURE — 80061 LIPID PANEL: CPT | Performed by: FAMILY MEDICINE

## 2025-02-20 PROCEDURE — 83036 HEMOGLOBIN GLYCOSYLATED A1C: CPT | Performed by: FAMILY MEDICINE

## 2025-02-20 PROCEDURE — 82607 VITAMIN B-12: CPT | Performed by: FAMILY MEDICINE

## 2025-02-20 PROCEDURE — 80050 GENERAL HEALTH PANEL: CPT | Performed by: FAMILY MEDICINE

## 2025-02-20 PROCEDURE — 84480 ASSAY TRIIODOTHYRONINE (T3): CPT | Performed by: FAMILY MEDICINE

## 2025-02-20 PROCEDURE — 81003 URINALYSIS AUTO W/O SCOPE: CPT | Performed by: FAMILY MEDICINE

## 2025-02-20 PROCEDURE — 99396 PREV VISIT EST AGE 40-64: CPT | Performed by: FAMILY MEDICINE

## 2025-02-20 NOTE — PROGRESS NOTES
A [employer] message has been sent to the patient for PATIENT  [place] [facility] [place]

## 2025-02-20 NOTE — PROGRESS NOTES
Female Physical Note      Date: 2025   Patient Name: Reanna Alejandre  : 1975   MRN: [id number]     Chief Complaint:    Chief Complaint   Patient presents with    Annual Exam    Hypertension    Hyperlipidemia       History of Present Illness: Reanna Alejandre is a 49 y.o. female who is here today for their annual health maintenance and physical.  Patient also returns for follow-up of her chronic medical conditions.    History of Present Illness  The patient is a 49-year-old female who presents for a wellness visit.    She has been experiencing elevated blood pressure, which she attributes to the use of the new blood pressure cuffs. She was prescribed telmisartan but did not start it as her blood pressure readings were within normal limits at home. She does not monitor her blood pressure regularly at home. She reports no chest pain, shortness of breath, or palpitations. Her respiratory function is normal, and she maintains a good sleep pattern.    She has not had a Pap smear in several years and is currently seeking a healthcare provider for this service.    She continues to take bisoprolol, B12, and thyroid medications. She reports feeling well after reducing her thyroid medication dosage.    MEDICATIONS  Current: Bisoprolol, B12, thyroid medication.    IMMUNIZATIONS  She declined the influenza vaccine and COVID-19 vaccine.     Patient appears to be doing otherwise well.  They have continue with their medications without any side effects.  They have not had any changes in their usual activity, appetite and sleep.  Patient denies any other cardiovascular, respiratory, gastrointestinal, urologic or neurologic complaints.    Subjective      Review of Systems:   Review of Systems   Constitutional:  Negative for activity change, appetite change, chills, diaphoresis, fatigue and fever.   HENT:  Negative for congestion, sore throat and swollen glands.    Respiratory:  Negative for cough, chest tightness,  shortness of breath and wheezing.    Cardiovascular:  Negative for chest pain, palpitations and leg swelling.   Gastrointestinal:  Negative for abdominal distention, abdominal pain, blood in stool, constipation, diarrhea, nausea, vomiting, GERD and indigestion.   Genitourinary:  Negative for difficulty urinating, dysuria, flank pain, frequency, hematuria and urgency.   Musculoskeletal:  Negative for arthralgias, back pain, gait problem, joint swelling, myalgias and neck pain.   Skin:  Negative for rash.   Neurological:  Negative for dizziness, tremors, seizures, syncope, weakness, light-headedness, numbness, headache and memory problem.   Psychiatric/Behavioral:  Negative for sleep disturbance and depressed mood. The patient is not nervous/anxious.        Past Medical History, Social History, Family History and Care Team were all reviewed with patient and updated as appropriate.     Medications:     Current Outpatient Medications:     bisoprolol (ZEBeta) 10 MG tablet, Take 1 tablet by mouth every night at bedtime., Disp: 90 tablet, Rfl: 3    Cyanocobalamin (VITAMIN B12 PO), Take  by mouth., Disp: , Rfl:     levothyroxine (SYNTHROID, LEVOTHROID) 112 MCG tablet, Take 1 tablet by mouth Daily., Disp: 90 tablet, Rfl: 3    Allergies:   No Known Allergies    Immunizations:  Health Maintenance Summary            Overdue - PAP SMEAR (Every 3 Years) Never done      02/15/2024  Postponed until 4/8/2024 by Cory Meyer MD (Pending event - Patient will schedule.)              Ordered - LIPID PANEL (Yearly) Ordered on 2/20/2025      02/15/2024  Lipid Panel    02/14/2023  Lipid Panel              Overdue - BMI FOLLOWUP (Yearly) Overdue since 2/15/2025      02/15/2024  Smart[employer]: WORKFLOW - QUALITY MEASUREMENT - DOCUMENTED WEIGHT FOLLOW-UP PLAN    02/14/2023  Smart[employer]: WORKFLOW - QUALITY MEASUREMENT - DOCUMENTED WEIGHT FOLLOW-UP PLAN              Postponed - INFLUENZA VACCINE (Yearly - July to March) Postponed until  3/31/2025      08/15/2024  Postponed until 3/31/2025 by Krystle Turpin MA (Product Unavailable)    02/15/2024  Postponed until 3/31/2024 by Cory Meyer MD (Patient Refused)    02/14/2023  Postponed until 3/31/2023 by Cory Meyer MD (Patient Refused)    12/03/2017  Imm Admin: Fluzone  >6mos    12/02/2010  Imm Admin: Fluzone  >6mos    Only the first 5 history entries have been loaded, but more history exists.              Postponed - COVID-19 Vaccine (2 - 2024-25 season) Postponed until 2/19/2026 02/19/2025  Postponed until 2/19/2026 by Krystle Turpin MA (Patient Refused)    02/15/2024  Postponed until 2/15/2025 by Cory Meyer MD (Patient Refused)    02/14/2023  Postponed until 3/5/2024 by Cory Meyer MD (Pending event)    09/09/2021  Imm Admin: COVID-19 (PFIZER) Purple Cap Monovalent              ANNUAL PHYSICAL (Yearly) Next due on 2/20/2026 02/20/2025  Done    02/15/2024  Done    02/14/2023  Done              MAMMOGRAM (Every 2 Years) Next due on 7/23/2026 07/23/2024  Mammo Screening Digital Tomosynthesis Bilateral With CAD    07/19/2023  Mammo Screening Digital Tomosynthesis Bilateral With CAD    06/30/2022  [facility] MAMMOGRAPHY    06/30/2022  SCANNED - MAMMO              COLORECTAL CANCER SCREENING (COLOGUARD - Every 3 Years) Next due on 2/19/2027 02/19/2024  Cologuard component of Cologuard - Stool, Per Rectum    02/21/2023  FECAL OCCULT BLOOD TEST (Done)              TDAP/TD VACCINES (2 - Td or Tdap) Next due on 2/14/2033 02/14/2023  Imm Admin: Tdap              HEPATITIS C SCREENING  Completed      02/14/2023  Hepatitis C Antibody              Pneumococcal Vaccine 0-49 (Series Information) Aged Out      02/14/2023  Postponed until 10/23/2040 by Cory Meyer MD (Not Indicated)                     No orders of the defined types were placed in this encounter.       Colorectal Screening:   Up-to-date.  Last Completed  "Colonoscopy            COLORECTAL CANCER SCREENING (COLOGUARD - Every 3 Years) Next due on 2/19/2027 02/19/2024  Cologuard component of Cologuard - Stool, Per Rectum    02/21/2023  FECAL OCCULT BLOOD TEST (Done)                  Pap: Advised  Last Completed Pap Smear       This patient has no relevant Health Maintenance data.           Mammogram: Patient had 1 back in July but has an increase in Brijesh [place] score.  Last Completed Mammogram            MAMMOGRAM (Every 2 Years) Next due on 7/23/2026 07/23/2024  Mammo Screening Digital Tomosynthesis Bilateral With CAD    07/19/2023  Mammo Screening Digital Tomosynthesis Bilateral With CAD    06/30/2022  HM MAMMOGRAPHY    06/30/2022  SCANNED - MAMMO                     CT for Smoker (Age 50-80, 20 pk yr):   Deferred.  Bone Density/DEXA (Age 65 or high risk): Deferred.  Hep C (Age 18-79 once): Previously done.  HIV (Age 15-65 once): No results found for: \"HIV1X2\"  A1c:   Hemoglobin A1C   Date Value Ref Range Status   02/15/2024 5.40 4.80 - 5.60 % Final      Lipid panel:  No results found for: \"LIPIDEXCLUSI\"    The 10-year ASCVD risk score (Alex DK, et al., 2019) is: 2.8%    Values used to calculate the score:      Age: 49 years      Sex: Female      Is Non- : No      Diabetic: No      Tobacco smoker: No      Systolic Blood Pressure: 136 mmHg      Is BP treated: Yes      HDL Cholesterol: 39 mg/dL      Total Cholesterol: 204 mg/dL    Dermatology: Advised if necessary.  Ophthalmologist: Up-to-date.  Dentist: Up-to-date.    Tobacco Use: Medium Risk (2/20/2025)    Patient History     Smoking Tobacco Use: Former     Smokeless Tobacco Use: Never     Passive Exposure: Past       Social History     Substance and Sexual Activity   Alcohol Use Not Currently        Social History     Substance and Sexual Activity   Drug Use Never        Diet/Physical activity: Well-balanced diet/daily exercise.    Sexual Health: No issues at present time.  " "  Menopause: No issues at present time.  Menstrual Cycles: No issues at present time.    Depression: PHQ-2 Depression Screening  PHQ-9 Total Score:  0    Measures:   Advanced Care Planning:   Patient does not have an advance directive, information provided.    Smoking Cessation:   Non-smoker.    Objective     Physical Exam:  Vital Signs:   Vitals:    02/20/25 0830 02/20/25 0851   BP: (!) 144/102 136/94   BP Location: Left arm    Patient Position: Sitting    Cuff Size: Adult    Pulse: 65    Resp: 16    Temp: 98 °F (36.7 °C)    TempSrc: Temporal    SpO2: 96%    Weight: 71.4 kg (157 lb 6.4 oz)    Height: 162.6 cm (64\")    PainSc: 0-No pain      Body mass index is 27.02 kg/m².   Facility age limit for growth %dayo is 20 years.    Physical Exam    POCT Results (if applicable);   Results for orders placed or performed in visit on 08/15/24   T4, Free    Collection Time: 08/15/24  8:30 AM    Specimen: Arm, Left; Blood   Result Value Ref Range    Free T4 1.85 (H) 0.92 - 1.68 ng/dL   TSH    Collection Time: 08/15/24  8:30 AM    Specimen: Arm, Left; Blood   Result Value Ref Range    TSH 3.050 0.270 - 4.200 uIU/mL   T3    Collection Time: 08/15/24  8:30 AM    Specimen: Arm, Left; Blood   Result Value Ref Range    T3, Total 92.8 80.0 - 200.0 ng/dl        Procedures    Assessment / Plan      Assessment/Plan:   Diagnoses and all orders for this visit:    1. Well adult exam (Primary)  Patient did have a wellness exam performed today that did not reveal any abnormality.  Patient's anxiety/depression scores were reviewed.  We will continue to monitor laboratory data as well as symptomatology and if there are any other questions or concerns prior to the next scheduled follow-up they will contact us.  -     CBC (No Diff); Future  -     Comprehensive Metabolic Panel; Future  -     Hemoglobin A1c; Future  -     Lipid Panel; Future  -     Urinalysis without microscopic (no culture) - Urine, Clean Catch; Future  -     Vitamin B12; " Future  -     TSH Rfx On Abnormal To Free T4; Future  -     T4, Free; Future  -     T3; Future    2. Primary hypertension   Patient appears to be tolerating their blood pressure medicine without any side effects.  We will continue to monitor their blood pressure and will adjust to keep there is systolic blood pressure less than 130.  We will continue to monitor renal function and will make adjustments based on these findings.  Patient has had some increase in her blood pressure recently.  She will start monitoring at home and she will start the Micardis if she does have continued elevation of greater than 130/90.    3. Mixed hyperlipidemia   Patient does appear to be tolerating their lipid-lowering agent without difficulty.  We will obtain the lipid profile as well as liver function test to observe for any abnormalities.  We will continue to adjust medications to keep their LDL less than 70.    4. Postoperative hypothyroidism   Patient does appear to be doing well at present time with respect to their thyroid symptomatology.  They appear to be tolerating the medication without complaints.  We will continue to monitor the thyroid function test and if they develop any abnormalities further adjustments of medication may be necessary.  If they develop any signs of hypothyroidism or hyperthyroidism they will contact us immediately for evaluation.    5. Immunization declined   Patient would benefit from having immunizations given.  Patient has elected not to receive the recommended vaccines at present time.  They understand the risk of not receiving these vaccine and the disease in which they may incur.  We have discussed other options and will pursue with encouragement of compliance with future appointments.  If patient does change their minds prior to the next scheduled follow-up, they may contact us and we will provide immunization at that time.    6. Increased risk of breast cancer   Patient does have a strong family  history of breast cancer.  Her mother and 2 aunts have had breast cancer with no one having ovarian cancer.  She does have an increase in Tyrer-Cuzick risk of 26.7%.  We have discussed the risk and benefit and we will make arrangements for a breast MRI.       Healthcare Maintenance:  Counseling provided based on age appropriate USPSTF guidelines.  BMI is >= 25 and <30. (Overweight) The following options were offered after discussion;: exercise counseling/recommendations and nutrition counseling/recommendations    Reanna Alejandre voices understanding and acceptance of this advice and will call back with any further questions or concerns. AVS with preventive healthcare tips printed for patient.     Follow Up:   Return in about 6 months (around 8/20/2025).        At [facility], we believe that sharing information builds trust and better relationships. You are receiving this note because you recently visited [facility]. It is possible you will see health information before a provider has talked with you about it. This kind of information can be easy to misunderstand. To help you fully understand what it means for your health, we urge you to discuss this note with your provider.    Cory Meyer MD  [facility]  Answers submitted by the patient for this visit:  Problem not listed (Submitted on 2/19/2025)  Chief Complaint: Other medical problem  Reason for appointment: 6 month check up  anorexia: No  joint pain: No  change in stool: No  headaches: No  joint swelling: No  vertigo: No  visual change: No  Onset: at an unknown time  Medications tried: Levothyroxine

## 2025-02-21 LAB — HBA1C MFR BLD: 5.4 % (ref 4.8–5.6)

## 2025-02-24 ENCOUNTER — TELEPHONE (OUTPATIENT)
Dept: FAMILY MEDICINE CLINIC | Facility: CLINIC | Age: 50
End: 2025-02-24
Payer: COMMERCIAL

## 2025-02-24 DIAGNOSIS — R31.29 MICROSCOPIC HEMATURIA: Primary | ICD-10-CM

## 2025-02-24 NOTE — PROGRESS NOTES
Attempted to call patient, no answer.  Vm left for patient to return call. Telephone encounter created

## 2025-02-24 NOTE — TELEPHONE ENCOUNTER
Attempted to call patient, no answer.  Vm left for patient to return call. Patient viewed results via [employer].  Relay   results

## 2025-02-24 NOTE — TELEPHONE ENCOUNTER
Have her come back in within the next 2 to 3 weeks with repeat urinalysis.  If it still continues to show blood, we will need to start workup.

## 2025-02-24 NOTE — TELEPHONE ENCOUNTER
Patient was informed of results and verbalized good understanding and appreciation.  She has not had a workup for blood in her urine.

## 2025-02-24 NOTE — TELEPHONE ENCOUNTER
----- Message from Cory Meyer sent at 2/23/2025  1:00 PM EST -----  Patient is urine reveals a small amount of blood.  Has she had a workup in the past?  Her complete blood count is appropriate.  B12 is elevated.  If she is taking B12 supplementation she may reduce the dose.  Lipid profile continues to reveal elevated LDL but she is not at increasing cardiovascular risk.  Hemoglobin A1c is appropriate.  Thyroid function test is good.  T3 level is good.  Free T4 is acceptable.  Her kidney liver function tests are appropriate.  No changes at present time.

## 2025-03-21 ENCOUNTER — LAB (OUTPATIENT)
Dept: FAMILY MEDICINE CLINIC | Facility: CLINIC | Age: 50
End: 2025-03-21
Payer: COMMERCIAL

## 2025-03-21 DIAGNOSIS — R31.29 MICROSCOPIC HEMATURIA: ICD-10-CM

## 2025-03-21 LAB
BACTERIA UR QL AUTO: ABNORMAL /HPF
BILIRUB UR QL STRIP: NEGATIVE
CLARITY UR: CLEAR
COLOR UR: YELLOW
GLUCOSE UR STRIP-MCNC: NEGATIVE MG/DL
HGB UR QL STRIP.AUTO: ABNORMAL
HOLD SPECIMEN: NORMAL
HYALINE CASTS UR QL AUTO: ABNORMAL /LPF
KETONES UR QL STRIP: NEGATIVE
LEUKOCYTE ESTERASE UR QL STRIP.AUTO: ABNORMAL
NITRITE UR QL STRIP: NEGATIVE
PH UR STRIP.AUTO: 6 [PH] (ref 5–8)
PROT UR QL STRIP: NEGATIVE
RBC # UR STRIP: ABNORMAL /HPF
REF LAB TEST METHOD: ABNORMAL
SP GR UR STRIP: 1.01 (ref 1–1.03)
SQUAMOUS #/AREA URNS HPF: ABNORMAL /HPF
UROBILINOGEN UR QL STRIP: ABNORMAL
WBC # UR STRIP: ABNORMAL /HPF

## 2025-03-21 PROCEDURE — 81001 URINALYSIS AUTO W/SCOPE: CPT | Performed by: FAMILY MEDICINE

## 2025-03-23 ENCOUNTER — RESULTS FOLLOW-UP (OUTPATIENT)
Dept: FAMILY MEDICINE CLINIC | Facility: CLINIC | Age: 50
End: 2025-03-23
Payer: COMMERCIAL

## 2025-03-23 DIAGNOSIS — R31.21 ASYMPTOMATIC MICROSCOPIC HEMATURIA: Primary | ICD-10-CM

## 2025-04-01 ENCOUNTER — OFFICE VISIT (OUTPATIENT)
Age: 50
End: 2025-04-01
Payer: COMMERCIAL

## 2025-04-01 VITALS
SYSTOLIC BLOOD PRESSURE: 110 MMHG | OXYGEN SATURATION: 98 % | HEIGHT: 64 IN | TEMPERATURE: 98.4 F | BODY MASS INDEX: 26.46 KG/M2 | WEIGHT: 155 LBS | DIASTOLIC BLOOD PRESSURE: 72 MMHG | HEART RATE: 51 BPM

## 2025-04-01 DIAGNOSIS — R31.9 HEMATURIA, UNSPECIFIED TYPE: Primary | ICD-10-CM

## 2025-04-01 LAB
BILIRUB BLD-MCNC: NEGATIVE MG/DL
CLARITY, POC: CLEAR
COLOR UR: ABNORMAL
EXPIRATION DATE: ABNORMAL
GLUCOSE UR STRIP-MCNC: NEGATIVE MG/DL
KETONES UR QL: NEGATIVE
LEUKOCYTE EST, POC: NEGATIVE
Lab: ABNORMAL
NITRITE UR-MCNC: NEGATIVE MG/ML
PH UR: 6 [PH] (ref 5–8)
PROT UR STRIP-MCNC: NEGATIVE MG/DL
RBC # UR STRIP: ABNORMAL /UL
SP GR UR: 1 (ref 1–1.03)
UROBILINOGEN UR QL: ABNORMAL

## 2025-04-01 NOTE — PROGRESS NOTES
Office Visit     Patient: Reanna Alejandre 49 y.o. female   : 1975   MRN: [id number]      Patient or patient representative verbalized consent for the use of Ambient Listening during the visit with  FREDERICK Valdivia for chart documentation. 2025  14:38 EDT     Chief Complaint   Patient presents with    Establish Care    Blood in Urine     Microscopic hematuria     Referring Provider: Cory Meyer*    Primary Care Provider: Cory Meyer MD     History of Present Illness  The patient presents for evaluation of hematuria.    Hematuria  - She reports being informed of hematuria but has not observed it herself.  - She is currently menstruating, with her cycle starting on 2025.  - A CT scan was ordered on 2025 and is scheduled for Friday morning.  - She reports no urinary issues, history of kidney stones, or symptoms of urinary tract infections.    SOCIAL HISTORY  Former smoker, quit . No history of secondhand smoke exposure.    FAMILY HISTORY  No family history of bladder or kidney cancer, genitourinary conditions, or Ramirez syndrome.      Subjective   Review of System:   As noted in HPI.    Past Medical History:   Diagnosis Date    Acute bronchitis     B12 deficiency     Disorder of thyroid     Fatigue     Goiter     Headache, emotional tension     Heartburn     Hives     HTN (hypertension), benign     Hyperlipidemia     Hypothyroidism, postop     Scabies exposure     UTI (urinary tract infection)     Viral URI     Vitamin D deficiency      Past Surgical History:   Procedure Laterality Date    TOTAL THYROIDECTOMY  2019     Family History   Problem Relation Age of Onset    Breast cancer Mother     Cancer Mother     Thyroid cancer Other     Heart attack Other     Cancer Father     Heart disease Father      Social History     Socioeconomic History    Marital status:    Tobacco Use    Smoking status: Former     Current packs/day: 0.00     Average  "packs/day: 0.3 packs/day for 22.0 years (5.5 ttl pk-yrs)     Types: Cigarettes     Start date: 1991     Quit date: 2013     Years since quittin.2     Passive exposure: Past    Smokeless tobacco: Never   Vaping Use    Vaping status: Some Days    Substances: Nicotine, Flavoring    Devices: Disposable    Passive vaping exposure: Yes   Substance and Sexual Activity    Alcohol use: Not Currently    Drug use: Never    Sexual activity: Yes     Partners: Male     Birth control/protection: Vasectomy       Current Outpatient Medications:     bisoprolol (ZEBeta) 10 MG tablet, Take 1 tablet by mouth every night at bedtime., Disp: 90 tablet, Rfl: 3    Cyanocobalamin (VITAMIN B12 PO), Take  by mouth., Disp: , Rfl:     levothyroxine (SYNTHROID, LEVOTHROID) 112 MCG tablet, Take 1 tablet by mouth Daily., Disp: 90 tablet, Rfl: 3    No Known Allergies  Objective   Visit Vitals  /72   Pulse 51   Temp 98.4 °F (36.9 °C) (Temporal)   Ht 162.6 cm (64\")   Wt 70.3 kg (155 lb)   SpO2 98%   BMI 26.61 kg/m²        Body mass index is 26.61 kg/m².     Physical Exam  Vitals and nursing note reviewed.   Constitutional:       General: She is awake. She is not in acute distress.  Pulmonary:      Effort: Pulmonary effort is normal.   Neurological:      Mental Status: She is alert and oriented to person, place, and time. Mental status is at baseline.   Psychiatric:         Mood and Affect: Mood normal.         Behavior: Behavior is cooperative.          Labs  Lab Results   Component Value Date    COLORU Straw 2025    CLARITYU Clear 2025    SPECGRAV 1.005 (A) 2025    PHUR 6.0 2025    LEUKOCYTESUR Negative 2025    NITRITE Negative 2025    PROTEINPOCUA Negative 2025    GLUCOSEUR Negative 2025    KETONESU Negative 2025    UROBILINOGEN 0.2 E.U./dL 2025    BILIRUBINUR Negative 2025    RBCUR 3+ (A) 2025      Lab Results   Component Value Date    WBCUA 3-5 (A) 2025 " "   WBCUA 0-2 02/14/2023    RBCUA 0-2 03/21/2025    RBCUA 6-12 (A) 02/14/2023    BACTERIA Trace (A) 03/21/2025    BACTERIA 2+ (A) 02/14/2023    HYALCASTU 3-6 03/21/2025    HYALCASTU 3-6 02/14/2023    SQUAMEPIUA 3-6 (A) 03/21/2025    SQUAMEPIUA 13-20 (A) 02/14/2023        Lab Results   Component Value Date    WBC 5.12 02/20/2025    HGB 12.7 02/20/2025    HCT 39.8 02/20/2025    MCV 93.0 02/20/2025     02/20/2025     Lab Results   Component Value Date    GLUCOSE 91 02/20/2025    CALCIUM 9.3 02/20/2025     02/20/2025    K 5.2 02/20/2025    CO2 25.5 02/20/2025     02/20/2025    BUN 10 02/20/2025    BUN 14 02/15/2024    CREATININE 0.69 02/20/2025    CREATININE 0.69 02/15/2024    EGFR 106.5 02/20/2025    EGFR 107.2 02/15/2024    BCR 14.5 02/20/2025    ANIONGAP 13.5 02/20/2025    ALT 11 02/20/2025    AST 14 02/20/2025       Lab Results   Component Value Date    HGBA1C 5.40 02/20/2025        No results found for: \"URICACIDSTN\", \"RKSU8IHYCEJ\", \"SMLP9QPJRP\", \"LABMAGN\", \"CAPHOSSTONE\", \"HYDROXYAPATI\"  No results found for: \"VNDE93IR\", \"CAION\", \"PTH\", \"URICACID\"  No results found for: \"CYSTINE\", \"URINEVOLUM\", \"CALCIUMUR\", \"OXALATEU\", \"CITRATEUR\", \"LABPH\", \"URICUR\", \"NAUR\", \"KUR\", \"MAGNESIUMUR\", \"PHOSUR\", \"AMMONIUMUR\", \"CLUR\", \"BRHRDBULQ58Y\", \"UREAUR\", \"LABCREAUR\", \"CAOXSAT\", \"PROTEINCATRT\"    No results found for: \"ATOPOBIUMV\", \"BVAB2\", \"MEGASPHAER\", \"CALBICANSN\", \"CGLABRATAN\", \"CPARAPSILOS\", \"CLUSITANIAE\", \"CKRUSEI\", \"TRICHVAG\", \"CHLAMNAA\", \"NGONORRHON\", \"UREAPLASMA\", \"MYCOPLASMAG\"    Lab Results   Component Value Date    TSH 2.290 02/20/2025    FREET4 1.63 02/20/2025    FHOFLJUV26 1,262 (H) 02/20/2025     I have reviewed the above labs.      Assessment / Plan      Diagnoses and all orders for this visit:    1. Hematuria, unspecified type (Primary)  -     POC Urinalysis Dipstick, Automated         Assessment & Plan  1. Hematuria: Unspecified.  - Low risk for bladder cancer given age, lack of significant smoking " history, and absence of family history.  - Urine sample from 03/21/2025 showed moderate blood but no red blood cells microscopically.  - Sample slightly contaminated with squamous cells, white blood cells, and bacteria.  - Similar incident 2 years ago.  - No confirmed microscopic hematuria.  - CT scan ordered to rule out renal calculi.  - If CT scan reveals abnormalities in kidneys, bladder, or ureters, contact via MyChart for further evaluation.    Follow-up  - CT scan scheduled for Friday morning.  - Follow up with urology PRABRAHAM Jackson, MSN, APRN, FNP-C  [facility]

## 2025-04-04 ENCOUNTER — HOSPITAL ENCOUNTER (OUTPATIENT)
Facility: HOSPITAL | Age: 50
Discharge: HOME | End: 2025-04-04
Admitting: FAMILY MEDICINE
Payer: COMMERCIAL

## 2025-04-04 DIAGNOSIS — Z91.89 INCREASED RISK OF BREAST CANCER: ICD-10-CM

## 2025-04-04 PROCEDURE — A9577 INJ MULTIHANCE: HCPCS | Performed by: FAMILY MEDICINE

## 2025-04-04 PROCEDURE — C8937 CAD BREAST MRI: HCPCS

## 2025-04-04 PROCEDURE — C8908 MRI W/O FOL W/CONT, BREAST,: HCPCS

## 2025-04-04 PROCEDURE — 25510000002 GADOBENATE DIMEGLUMINE 529 MG/ML SOLUTION: Performed by: FAMILY MEDICINE

## 2025-04-04 RX ADMIN — GADOBENATE DIMEGLUMINE 14 ML: 529 INJECTION, SOLUTION INTRAVENOUS at 12:35

## 2025-04-08 DIAGNOSIS — R31.21 ASYMPTOMATIC MICROSCOPIC HEMATURIA: Primary | ICD-10-CM

## 2025-04-08 DIAGNOSIS — K76.9 HEPATIC LESION: ICD-10-CM

## 2025-04-18 RX ORDER — BISOPROLOL FUMARATE 10 MG/1
10 TABLET, FILM COATED ORAL
Qty: 90 TABLET | Refills: 3 | Status: SHIPPED | OUTPATIENT
Start: 2025-04-18